# Patient Record
Sex: MALE | Race: WHITE
[De-identification: names, ages, dates, MRNs, and addresses within clinical notes are randomized per-mention and may not be internally consistent; named-entity substitution may affect disease eponyms.]

---

## 2018-04-23 ENCOUNTER — HOSPITAL ENCOUNTER (OUTPATIENT)
Dept: HOSPITAL 95 - LAB SHORT | Age: 68
Discharge: HOME | End: 2018-04-23
Attending: FAMILY MEDICINE
Payer: MEDICARE

## 2018-04-23 DIAGNOSIS — L02.416: Primary | ICD-10-CM

## 2020-09-29 NOTE — NUR
SHIFT SUMMARY
RECEIVED REPORT FROM SHAILESH RAHMAN IN ED. PT TO ROOM AT APPROX 1235; TRANSFERED TO
BED VIA SLIDER SHEET. PT ORIENTED TO ROOM AND CALL LIGHT. EDUCATED ON FALL
RISK AND NEED TO CALL FOR ASSISTANCE. PT A&Ox3; CALM AND COOPERATIVE WITH
CARE. PT RESTING IN BED DURING SHIFT. PT REPORT CHRONIC BACK PAIN; MEDICATED
x1 WITH FENT WITH POSITIVE RESULTS. PT REPORTS DIZZINESS WITH STANDING AND
WALKING. PT DENIES CHEST PAIN, SOB, NASUEA. PT RECEIVING PROTONIX GTT. BP
ELEVATED, TRENDING DOWN; OTHER VSS. NO OTHER ACUTE CHANGES NOTED DURING SHIFT.
WILL CONTINUE TO MONITOR UNTIL REPORT GIVEN TO ONCOMING RN.

## 2020-09-30 NOTE — NUR
09/30/20 1744 Alena Young
History, Chart, Medications and Allergies reviewed before start of
procedure. PATIENT CONFIRMS NPO STATUS AND AGREES WITH SCHEDULED
PROCEDURE. MONITOR INTACT WITH CONTINUOUS PULSE OXIMETRY AND
INTERMITTENT BP. O2 VIA N/C INTACT THROUGHOUT SEDATION/PROCEDURE.
3-LEAD EKG REVIEWED WITH PHYSICIAN PRIOR TO START OF PROCEDURE. DR. MCNEIL PROVIDING MAC.

## 2020-09-30 NOTE — NUR
SHIFT SUMMARY
PT A&Ox4; CALM AND COOPERATIVE WITH CARE. PT RESTING IN BED DURING SHIFT.
CONTINUES TO HAVE MULTIPLE BOUTS OF BLACK LIQUID STOOL. PT REPORTS BACK AND
ABD PAIN; MEDICATED x1 WITH POSITIVE RESULTS. PT DENIES SOB, NAUSEA AND
DIZZINESS. PT TO EGD THIS EVENING. BP ELEVATED, MEDICATED x1 WITH PRN
LABATOLOL WITH POSTIIVE RESULTS. OTHER VSS. NO OTHER ACUTE CHANGES NOTED
DURING SHFIT. WILL CONTINUE TO MONITOR UNTIL REPORT GIVEN TO ONCOMING RN.

## 2020-09-30 NOTE — NUR
ELEVATED TROPONIN
 
MD PIMENTEL NOTIFIED OF PT ELEVATED TROPONIN 0.067. MD PIMENTEL ORDERED 2 MORE
TROPONIN LABS TO FURTHER MONITOR. PT DENIES CHEST PAIN. WILL CONTINUE TO
MONITOR.

## 2020-09-30 NOTE — NUR
SHIFT SUMMARY
 
PT A&OX4. SP02 >92% ON RA. PT ATTEMPTED CPAP WHILE SLEEPING BUT STATES HE
COULDNT GET A GOOD SEAL/PREFERRED HIS HOME EQUIPMENT. WORE CPAP FOR APPROX AN
HOUR. TELEMETRY READS SR, HR 90'S.  PT HAD MULTIPLE EPISODES OF BLACK, LIQUID
STOOL. PT C/O OF ABD AND BACK PAIN. MEDICATED WITH FENTANYL PER EMAR X2 THIS
SHIFT. PROTONIX INFUSING T/O SHIFT. PT'S RIGHT IV WAS PULLED OUT
ACCIDENTALLY BY PT.  NURSE ATTEMPTED TO START A NEW IV IN R HAND, TO FUFILL NS
FLUID ORDER. PT REFUSED IV AND NS THIS SHIFT. WILL CONTINUE TO MONITOR

## 2020-09-30 NOTE — NUR
PT BACK TO FROM EGD; REPORTS "CHEST PAIN" 9/10 HARD BALL; WORSE WHEN
BREATHING. NOTIFIED DR SEBASTIAN; NEW ORDERS FOR EKG AND STAT TROP. PT STATES PAIN
DECREASED TO 8/10; WHEN ASKED ABOUT CHEST PAIN, PT LUQ PAIN; PENDING TROP.
ELEVATED BP NOTED; STARTED TO TREND DOWN. REPORT GIVEN TO ONCOMING RN. PLANS
FOR COLONOSCOPY TOMORROW, DR SEBASTIAN AT BEDSIDE DISCUSSING PREP; PT EXPRESSING
CONCERNS REGARDING FREQUENCY OF BOWEL MOVEMENTS; NOTIFEID DR SEBASTIAN; NEW ORDERS
FOR RECTAL TUBE. REPORT GIVEN TO ONCOMING RN.

## 2020-10-01 NOTE — NUR
pt stool is  not clear enough for a scope, will give another round of
golytely, until clear. vs stable, no complaints except having to drink prep
again. call light in reach.

## 2020-10-01 NOTE — NUR
ELEVATED BP
 
PT WAS GIVEN LABATOLOL PRN FOR SYSTOLIC BP >160 PER EMAR. PT BP CONTINUED TO
BE ELEVATED. CALL PLACED TO PROVIDER. MD NOEL WITH ORDERS FOR PT HOME MED,
LISINOPRIL PO AND ONE TIME NORVASC PO. WILL CONTINUE TO MONITOR.

## 2020-10-01 NOTE — NUR
PT RETURNED TO ROOM FROM ENDOSCOPY, HE IS SLEEPY BUT EASILY WAKES, AND IS
ASKING ABOUT WATER. FULL LIQUID DIET WAS ORDERED, V.S. DONE. CALL LIGHT IN
REACH.

## 2020-10-01 NOTE — NUR
SHIFT SUMMARY
 
PT A&OX4. SP02 >94% ON RA. PT HAS HX OF SINDY, USES CPAP AT HOME WHILE SLEEPING.
PT REFUSED CPAP, STATES, "ITS NOT LIKE MY HOME ONE". STATES HE DOES NOT LIKE
THE MASK HERE. WITHOUT WEARING THE CPAP WHILE SLEEPING, PT O2 SATURATION WOULD
DECREASE, ALARMING THE CONTINUOUS PULSE OXIMETRY, CAUSING IT TO BEEP. PT
STATES THE BEEPING KEPT WAKING HIM UP. APPLIED A NEW CONTINUOUS PULSE OX
DEVICE TO FINGER WITH THE SAME RESULT. EDUCATED THE PT ON THE IMPORTANCE OF
WEARING CPAP BUT PT STILL NOT AGREEABLE TO WEARING CPAP. TELEMETRY READS SR,
HR 90'S. PT DENIES CHEST PAIN. PT DID HAVE AN ELEVATED TROPONIN LAB OF 0.067
CALLED TO MD, SEE PREVIOUS NOTE. LABATOLOL GIVEN PRN PER EMAR FOR SYSTOLIC BP
>160 X2 THIS SHIFT. PT CONTINUED TO HAVE BLACK, LIQUID STOOL. RECTAL TUBE
PLACED THIS SHIFT. DRAINING TO GRAVITY. RECTAL TUBE BAG HAD APPROX 300 MLS AT
END OF SHIFT.  CHANGED BAG, NEW BAG IN PLACE. PT CURRENTLY PREPPING FOR
COLONOSCOPY THIS AFTERNOON BY DRINKING GOLYLTLY INITIATED @ 0600, PT
TOLERATING AT THIS TIME. POWERGLIDE IN SIA INFUSING NS DRIP PER EMAR. CALL
LIGHT IN REACH. WILL CONTINUE TO MONITOR UNTIL END OF SHIFT.

## 2020-10-01 NOTE — NUR
10/01/20 1602 KASH RIVAS
History, Chart, Medications and Allergies reviewed before start of
procedure. 3-LEAD EKG REVIEWED WITH PHYSICIAN PRIOR TO START OF
PROCEDURE. O2 VIA N/C INTACT THROUGHOUT SEDATION/PROCEDURE. MONITOR
INTACT WITH CONTINUOUS PULSE OXIMETRY AND INTERMITTENT BP. MAC WITH
DR. WAY.

## 2020-10-01 NOTE — NUR
pt called nurse to room, states he pulled out his rectal tube trying to move
himself up in bed. it is out with balloon intact, stool in the tube is yellow
with a few small flecks, call to Dr. Montoya, he will scope him this afternoon.
got pt cleaned up, and informed him of plan. call light in reach.

## 2020-10-01 NOTE — NUR
pt laying in bed awake a/ox3, pleasant and cooperative with care, follows
commands well, denies pain, or complaints, lungs are clear in upper fields,
dim in bases, resp even and unlabored, no cough noted, is on r/a, hrr, tele in
place running sr per monitor, see strip, +1 edema noted to b/l le, ppp+2, cap
refill <3sec, vs stable, afebrile, iv site is power glide to avelina, site is
clear and patent, btx4, abd large soft nontender, has a rectal tube in place
draining black liquid stool, uses urinal to void, skin has some scattered
bruisings, otherwise c/w/d, maew, is bedrest at this time, call light in
reach.

## 2020-10-02 NOTE — NUR
PT LAYING IN BED AWAKE A/OX3, PLEASANT AND COOPERATIVE WITH CARE, FOLLOWS
COMMANDS WELL, DEINIES PAIN, STATES HE SLEPT WELL LAST NIGHT, LUNGS ARE CLEAR
IN UPPER FIELDS, DIM IN BASES, RESP EVEN AND UNLABORED, NO COUGH NOTED, IS
CURRENTLY ON R/A, HRR, TELE IN PLACE RUNNING SR PER MONITOR, SEE STRIP, NO
EDEMA NOTED, PPP+2, CAP REFILL <3SEC, VS STABLE, AFEBRILE, IV SITE IS A POWER
GLIDE TO SIA, SITE IS CLEAR AND PATENT, BTX4, ABD LARGE SOFT NONTENDER, VOIDS
IV URINAL, SKIN C/W/D, BOTTOM IS SORE FROM BOWEL PREP YESTERDAY, WILL PUT
OINTMENT ON IT, VOIDS VIA URINAL, SKIN IS C/D/I, MAEW, PULLS HIMSELF UP IN
BED, AND TURNS, JAROD, CALL LIGHT IN REACH.

## 2020-10-02 NOTE — NUR
pt has been discharged to home, will follow up with Dr. Huang as an outpt.
went over discharge instructions with him, he verbalized understanding, power
glide was removed intact. pt has all belongings, and will leave via wheelchair
to a cab to take him home. up to chair at this time.

## 2020-10-02 NOTE — NUR
SHIFT SUMMARY
 
PT A&OX4. SP02 >94% ON RA. PT WORE CPAP WHILE SLEEPING. SLEPT ON AND OFF T/O
THE NIGHT. TELEMETRY READS SR, HR 60'S-80'S. PT DENIES CHEST PAIN. LABATOLOL
GIVEN PRN PER EMAR FOR SYSTOLIC BP >160 X1 THIS SHIFT. PT HAD ELAVATED
SYSTOLIC BP AFTER LABATOLOL GIVEN. CALL PLACED TO MD NOEL, SEE PREVIOUS NOTE.
PT HAD BROWN, LIQUID STOOL WITH BEDPAN. C/O OF BACK PAIN AND LLE NUMBNESS,
MEDICATED WITH SCHEDULED GABAPENTIN PER EMAR. POWERGLIDE IN SIA, FLUSHES WELL.
  CALL LIGHT IN REACH. WILL CONTINUE TO MONITOR UNTIL END OF SHIFT.

## 2020-10-05 NOTE — NUR
SHIFT SUMMARY
PT IS ALERT AND ORIENTEDx4, FOLLOWS COMMANDS. C/O ABD PAIN TENDERNESS WITH
PALPITATION. NG LIS INCREASED BY DR MCCORMACK THIS AFTERNOON, OUTPUT SLOWED THIS
AFTERNOON. URINE OUT ALSO REMAINS LOW. 24 HOUR URINE IN PROGRESS,
WILL BE COMPLETED TOMORROW 10/6 AT 1100am. PT HAS MAINTAINED SPO2 ON CPAP THIS
AFTERNOON W/ 8L O2 BLED IN, SMALL LEAK IN MASK AROUND NG TUBE. MONITOR
CONTINUES TO SHOW PT IN SINUS TACH. LEVOPHED REMAINS ON TO MAINTAIN BP, SEE
ICU FLOWSHEET FOR TITRATIONS. PICC LINE PLACED THIS AFTERNOON. HEPARIN GTT
CONTINUES TO INFUSES, PER PHARMACY DOSING.

## 2020-10-05 NOTE — NUR
REASSESSMENT
PT REMAINS A&Ox4. BP'S HAVE MAINTAINED WITH LEVOPHED GTT, UNABLE TO TITRATE
DOWN CURRENTLY. PICC LINE ORDERED AND PICC RN NOTIFIED. NG OUTPUT HAS SLOWED
AND REMAINS OPEN TO LIS. PT REPORTS ABD DISCOMFORT IS IMPROVED. VERY LITTLE
URINARY OUTPUT NOTED SINCE ADMIT, DR WATT NOTIFIED. PT DROPS SPO2 WHEN
SLEEPING AND NEEDS INCREASED O2. RT NOTIFIED AND THEY WILL BRING CPAP AND SET
UP PER ORDERS. PT CONTINUES TO BE IN SINUS TACH ON MONITOR.

## 2020-10-05 NOTE — NUR
ASSUMED CARE:
 
PT A&O. IN ST. SBP IN THE 120S. MAP >60. HR IN THE 100S. LUNG SOUNDS CLEAR. ON
CPAP WITH 8L BEED IN. ABD IS DISTENDED, FIRM, AND TENDER. NGT TUBE IN PLACE
WITH BROWN/BLACK OUTPUT. PT HAS PICC TO DEBORAH, A PIV IN L HAND, AND 2 PIV IN R
ARM. HEPARIN, LEVOPHED, SODIUM BICARB INFUSING. 24 HR URINE BEING COLLECTED
VIA [place]. IS ON ICE. MINIMAL TO NO URINE OUTPUT. 24HR COLLECTION TO END AT
1100 TOMORROW. WILL CONTINUE TO MONITOR

## 2020-10-05 NOTE — NUR
PT ADMITTED TO ICU 5 FROM ER. UPON ARRIVAL PT WAS ALERT AND ORIENTED, ABLE TO
ANSWER QUESTIONS AND INTERACT WITH STAFF. PT WAS RECEIVING IVF BOLUS AND
LEVOPHED INFUSION. DR MCCORMACK AT BEDSIDE TO SEE PT. NG PLACED TO LIS PER DR MCCORMACK. GREEN BILE NOTED TO BE COMING OUT OF NG. PT REPORTS ABD PAIN WAS
GREATLY IMPROVED. ABD REMAINS DISTENDED. MONITOR SHOWED PT TO BE SINUS TACH,
PT DENIES CHESTPAIN CURRENTLY. BP INTIALLY LOW, NEW BOLUS STARTED AND TITRATED
LEVOPHED GTT. PT WAS ON 7L OXYMIZER AND LUNGS WERE DIMINISED THROUGH OUT, PT
REPORTS USE OF CPAP AT HOME. RT NOTIFIED OF USE. ECHO TECH ARRIVED TO BEDSIDE
FOR ECHO.

## 2020-10-06 NOTE — NUR
SURGERY PLANNED FOR 1200; WAITING FOR HEPARIN GTT TO BE OFF FOR 2 MORE HOURS.
DR MCCORMACK ATTEMPTED TO CALL PT'S BROTHER; NO ANSWER. NEIGHBOR CALLED AND
UPDATED W PT'S PERMISSION. PT IS CONFUSED TO DATE; UNABLE TO ANSWER COMPLEX
QUESTIONS.

## 2020-10-06 NOTE — NUR
PT BACK FROM OR AT 1435. PER DR MCCORMACK ENTIRE SMALL BOWEL NECROTIC. DRSG TO
ABD; WOUND UNCLOSED PER OR. PT ARRIVED W LEVOPHED AT 25MCG. ART LINE TO RIGHT
WRIST. BP STABLE. LEVOPHED DECREASED TO 15MCG OVER 1 HR. VASOPRESSIN INFUSING.
ANESTHESIOLOGIST STARTED 4TH LITER OF LR DURING OR CASE. PT AWAKE AT 1445.
FENT 50MCG GIVEN FOR PAIN. PT ABLE TO FOLLOW SIMPLE COMMANDS. PT REMAINED
AWAKE AFTER FENT. PROPOFOL RESTARTED AT 20MCG. DR MCCORMACK HAD LONG DISCUSSION W
PT'S BROTHER; THE PLAN IS TO WITHDRAWAL CARE AND PLACE PT ON COMFORT CARE.
PT'S BROTHER COMFIRMED THIS WITH THIS RN. JOSIAH' PT'S BROTHER HAD STATED IT
WOULD BE HIS BROTHERS WISHES TO DISCONNECT FROM LIFE SUPPORT AND TO LET HIM
PASS NATURALLY, WITHOUT FURTHER INTERVENTION, WHILE KEEPING PT COMFORTABLE AND
FREE FROM PAIN AS MUCH AS POSSIBLE.  PASTORAL CARE AT BEDSIDE ALONG W
PALLIATIVE CARE. DR WATT HAS BEEN FULLY UPDATED BY DR MCCORMACK.

## 2020-10-06 NOTE — NUR
pt daughter coming in,   patriotic quilt placed and pt given
American flag and therputic time with family .  at bedside.

## 2020-10-06 NOTE — NUR
PROPOFOL OFF PRIOR TO EXTUBATION. PT SOMEWHAT AWAKE, RESTLESS, KICKING LEGS,
RESTLESS. MS AND ATIVAN GIVEN FOR COMFORT. PRESSORS TURNED OFF AT 1620.

## 2020-10-06 NOTE — NUR
SHIFT SUMMARY:
NO ACUTE CHANGES T/O NIGHT. VSS ON 2MCG LEVOPHED. DURING NIGHT HIS O2 DEMANDS
INCREASE WHILE SLEEPING. ON CPAP WITH 8L BLEED IN. HEPARIN REMAINS INFUSING AT
13U. PT IS PAINFUL WITH TURNS. WILL PASS REPORT TO ONCOMING SHIFT

## 2020-10-06 NOTE — NUR
PT CALM ON PRECEDEX AT 0.3MCG, BUT BREATHING SHALLOW AND LABORED WITH RESP
30'S. LIPS DUSKY. BIPAP REPLACED; PT TOLERATING WELL. BP CONT TO HTN

## 2020-10-06 NOTE — NUR
PT TO OR IN CARE OF ANESTHESIOLOGIST AND 2 RN'S. PROPOFOL AND BICARB GTT
DISCONNECTED PER ANESTHESIA. BP IMPROVING WITH 3RD LITER OF LR. PT STILL ABLE
TO FOLLOW VERY SIMPLE COMMANDS.

## 2020-10-06 NOTE — NUR
post surgical patient. with poor prognosis. plan is comfort care review organ
donation and end of life care. pt on vent and supportive care. 
notifying family of plan and grief support.

## 2020-10-06 NOTE — NUR
PT BECAME SIGNIFICANTLY HYPOTENSIVE, O2 SATS STARTED TO JOSEFINA DOWN. CPAP
PLACED, RT AND DR WATT CALLED. PT EMERGENTLY INTUBATED AT 1114. LR 1 L BOLUS
STARTED, VASOPRESSIN STARTED.

## 2020-10-06 NOTE — NUR
PT ASSESSED AT 0700 THIS AM. HEPARIN GTT DC'D BY DR WARD. LEVOPHED WAS AT
2MCG AND STOPPED. MAP REMAINS >65. PT HAS ACUTE 10/10 ABD PAIN ON ASSESSMENT.
ABD SEVERLY DISTENDED, FIRM, TENDER. NG WOULD NOT FLUSH THIS AM; NG REMOVED,
LARGE KINK IN NGT. NEW NG PLACED TO R NARE AND PLACED TO SUCTION. 1.5L GASTRIC
CONTENT SUCTIONED OUT; PT DID HAVE SOME PAIN RELIEF. DR WATT, DR BHATT, AND
DR MCCORMACK IN TO SEE PT THIS AM. STAT ABD/PELVIC CT SCAN COMPLETED PER DR MCCORMACK.
LACTIC ACID REPEATED REMAINS ELEVATED. RESULTS GIVEN TO DR ALTAMIRANO AND DR MCCORMACK.
EKG COMPLETED AND SHOWN TO DR WARD WHO SAW PT THIS AM AT 0930. DR MCCORMACK
CALLED AT 0924 AND GIVEN FULL UPDATE; DR MCCORMACK WILL BE BACK SHORTLY TOP
RE-EVALUATE PT. FENT 50MCG GIVEN W SOME PAIN RELIEF; PT STILL HAVING
SIGNIFICANT ABD PAIN. NG REMAINS TO MOD INTERMIT. SX. PICC LINE KINKED THIS
AM, DYLAN REPLACED, TWO PORTS WORKING. CATHFLO PLACED TOP 3RD PORT, AS IT IS
NOT PATENT. PT REQUIRES 6L O2 VIA OXYMIZER. SATS >94%. SATS DROP TO 82% ON RA.
VERY LOW U/O, AROUND 15CC; DR ALTAMIRANO NOTIFIED.

## 2020-10-06 NOTE — NUR
SPOKE WITH PT'S BROTHER JOSIAH AND SISTER IN [place] ONCE AGAIN VIA PHONE TO
COMFIRM JEM. FAMILY REQUESTS THAT PT BE EXTUBATED AND PLACED ON COMFORT
CARE. FAMILY IS FLYING DOWN TO HELP WITH ARRANGEMENTS AND ARE AWARE THAT PT
MAY PASS AWAY BEFORE THEY ARRIVE. DR WATT UPDATED. PT'S NEIGHBOR AND FRIEND
AT BEDSIDE. PT'S BELONGINGS TO BE SENT HOME WITH JIM; FAMILY NOTIFIED.
(BELONGINGS: WALLET W 183$ CASH, 1 SILVER TONED NECKLACE, 2 SILVERTONED RINGS,
CELL PHONE, GLASSES, SOCKS, SHOES, WATCH).

## 2020-10-06 NOTE — NUR
LEVOPHED IS UP TO 20MCG, LR BOLUS IS INFUSING, 2 ADDITIONAL LITERS OF LR
ORDERED. PT  HAND TO COMMAND. PROPOFOL IS AT 20MCG; DR WATT AT BEDSIDE.

## 2020-10-06 NOTE — NUR
DAUGHTER AT BEDSIDE ALONG WITH PASTEROL CARE AND PALLIATIVE CARE. PT EXTUBATED
AQT 1702. PT RESTLESS POST EXTUBATION AND GRIMACING. MS MALI.

## 2020-10-06 NOTE — NUR
Spiritual care note:
 
Present throughout the day with this pt.  T/C to brother, Roger, GAMAL, Susan,
and friend, Anthony.  Anthony is the only one local.  Informed all of pt's rapid
decline.  Anthony arrived and needed  and comfort.  Apparently, Sesar has
been estranged from his dtr for over a year.  Anthony was able to find dtr's
phone # and contacted.  Dtr arrived distraught and tearful.  Gently explained
process of letting go.  Provided prayer and presence throughout extubation and
final heartbeat.  Sesar passed peacefully thanks to excellent nursing.  [facility]
and GAMAL to arrive from [place] tomorrow.  Per pt's wishes (according to Bailey Hadley) contact  for final arrangements.  Pt also expressed he wanted
Anthony to take his belongings home with her.  Family appreciaitve of
compassionate care by [facility] staff.